# Patient Record
Sex: MALE | URBAN - METROPOLITAN AREA
[De-identification: names, ages, dates, MRNs, and addresses within clinical notes are randomized per-mention and may not be internally consistent; named-entity substitution may affect disease eponyms.]

---

## 2023-01-28 ENCOUNTER — DOCUMENTATION ONLY (OUTPATIENT)
Dept: HEPATOLOGY | Facility: HOSPITAL | Age: 48
End: 2023-01-28

## 2023-01-28 NOTE — PROGRESS NOTES
"   Outside Transfer Acceptance Note / Regional Referral Center    Referring facility: [facility]   Referring provider: ANKUR ROBISON GREGORY R.  Accepting facility: [facility]  Accepting provider: Claudia Sterling MD  Admitting provider: Claudia Sterling MD  Reason for transfer:  Pancreatic pseudocyst  Transfer diagnosis: Pancreatic pseudocyst  Transfer specialty requested: Gastroenterology  Interventional Radiology  Transfer specialty notified: yes  Transfer level: NUMBER 1-5: 2  Bed type requested: Med Surg  Admission class or status: IP- Inpatient  IP- Inpatient      Narrative     Transfer Diagnosis:  Pancreatic pseudocyst  Reason for Transfer:  IR  Consultants:  IR  Transferring Facility:  [facility]  Transferring Destination: [facility]    Mr. Larry Santos is a 47 y.o. male with history of alcohol induced pancreatitis who presented to the [facility] ER on 1/28 for evaluation of abdominal pain and lower back pain that started the morning of 1/28.  He reported that the pain felt similar to prior episodes of pancreatitis.    Upon arrival to the ER, vitals were /76, HR 63.  Labs showed WBC 10, Lipase 125 and normal LFTs with Alcohol level 6.5.  CT of abdomen/pelvis showed an enlarged pancreatic pseudocyst abutting the tail pancreas, the stomach, and the splenic hilum.  Collection now measures 7 x 4.7cm and contains heterogeneous internal attenuation.  There is an adjacent area of irregular contrast probably arising from the left gastric artery.  Mass effect upon the splenic vessels, which remain patent. 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  Per GI/AES:  "Patient with a WOPN and pseudoaneurysm with likely recent bleeding within the pseudocyst. " "Given these findings patient with a contraindication for endoscopic ultrasound drainage at this time. Recommend IR engagement for coil embolization prior to any GI interventions. If IR is unavailable will transfer to a facility with IR availability."  Transfer is requested for IR evaluation, as the physician who normally does those procedures is out of town for a week.  He will be transferred to [facility] for further management.    ER visit in Care Everywhere.    Instructions      Hakeem Quintana-  Admit to Hospital Medicine  Upon patient arrival to floor, please send SecureChat to [facility] or call extension [phone] (if no answer, do NOT leave a callback number after the beep, rather please send a SecureChat to [facility]), for Hospital Medicine admit team assignment and for additional admit orders for the patient.  Do not page the attending physician associated with the patient on arrival (this physician may not be on duty at the time of arrival).  Rather, always send a SecureChat to [facility] or call [phone] to reach the triage physician for orders and team assignment.    Claudia Sterling MD, PAT-[facility]  Senior Physician  [facility]       "